# Patient Record
(demographics unavailable — no encounter records)

---

## 2024-11-16 NOTE — HISTORY OF PRESENT ILLNESS
[Nasal congestion] : nasal congestion [Fever] : fever [Nasal Congestion] : nasal congestion [Cough] : cough [de-identified] : fever, cough [FreeTextEntry6] : hree days ago, patient developed a high fever, at times reaching 103F. Tylenol was administered. The patient also started with a noticeable cough, described like that of a smoker. There was concern of a mouth infection due to an incident where the patient drank from the straw of a parent who has an active cold sore. No sores have developed in the patient, and her lips were dry and chapped. No history of a similar mouth infection is reported. Fever has persisted, cough has worsened to the point where it disturbs her sleep and she is producing green mucus.

## 2024-11-16 NOTE — DISCUSSION/SUMMARY
[FreeTextEntry1] : Assessment and Plan: - Purulent rhinitis: Child has thick green mucus and will need an antibiotic. - Therapeutic Interventions: Amoxicillin twice a day for 10 days. Also use saline in the nebulizer to loosen up the mucus. - Diagnostic Interventions: none needed  - Croup : Despite initially thinking her symptoms may have been due to exposure to a parent's cold sore, her symptoms align more with croup. Croup affects the vocal cords, windpipe and bronchial tubes, which could explain the 'smoker's cough'. - Therapeutic Interventions: Prednisolone for 3 days and Saline nebulizer 2-3 times a day - Patient Education: Information shared with the patient and the guardian about the croup. Recommend using mist from a humidifier. Allow the child to breathe cool air during the night by opening a window or door. Fever can be treated with an over-the-counter medication such as acetaminophen or ibuprofen. Coughing can be treated with warm, clear fluids to loosen mucus on the vocal cords. Warm water, apple juice, or lemonade is safe for children older than four months. Frozen juice popsicles also can be given. Keep the child's head elevated. If the child's stridor does not improve contact health care provider immediately. - Follow-Up: If not improving

## 2024-11-16 NOTE — PHYSICAL EXAM
[Mucoid Discharge] : mucoid discharge [Inflamed Nasal Mucosa] : inflamed nasal mucosa [Cobblestoning] : cobblestoning of posterior pharynx [NL] : warm, clear [Stridor] : no stridor

## 2024-12-03 NOTE — HISTORY OF PRESENT ILLNESS
[EENT/Resp Symptoms] : EENT/RESPIRATORY SYMPTOMS [Runny nose] : runny nose [Nasal congestion] : nasal congestion [Known Exposure to COVID-19] : no known exposure to COVID-19 [Hx of recent COVID-19 infection] : no history of recent COVID-19 infection [Dry cough] : dry cough [Acetaminophen] : acetaminophen [Last dose: _____] : last dose: [unfilled] [Fever] : fever [Nasal Congestion] : nasal congestion [Cough] : cough [Decreased Appetite] : decreased appetite [Max Temp: ____] : Max temperature: [unfilled] [de-identified] : Pt being having fever since Saturday and the cough started yesterday.

## 2025-01-19 NOTE — HISTORY OF PRESENT ILLNESS
[de-identified] : Fever and sore throat  [FreeTextEntry6] : Mother states pt has had a fever t max 102 and a sore throat since yesterday. pt is also coughing with congestion and has had diarrhea. Mother has administered Tylenol

## 2025-01-19 NOTE — PHYSICAL EXAM
[Alert] : alert [Tired appearing] : tired appearing [Clear] : right tympanic membrane clear [Clear Rhinorrhea] : clear rhinorrhea [NL] : warm, clear [Acute Distress] : no acute distress [Lethargic] : not lethargic [Toxic] : not toxic [Erythematous Oropharynx] : nonerythematous oropharynx

## 2025-01-19 NOTE — DISCUSSION/SUMMARY
[FreeTextEntry1] : Assessment and Plan:   - **[Influenza]:** Based on the symptoms presented by the patient - oral discomfort, fever, and congestion - and the positive rapid flu test, Ximena has contracted the flu.     - Therapeutic Interventions: The patient has been prescribed Tamiflu, 7.5 ml twice a day x 5 days.      - Diagnostic Tests: Rapid strep is negative and Rapid flu A is POSITIVE. Throat culture is sent out.     - Patient Education: The provider informed the patient's mother about how Tamiflu works by reducing the virus in the body and may shorten the duration of illness.      - Follow-Up: Return if fever continues and runny nose and cough worsen.

## 2025-01-19 NOTE — HISTORY OF PRESENT ILLNESS
[de-identified] : Fever and sore throat  [FreeTextEntry6] : Mother states pt has had a fever t max 102 and a sore throat since yesterday. pt is also coughing with congestion and has had diarrhea. Mother has administered Tylenol

## 2025-01-19 NOTE — HISTORY OF PRESENT ILLNESS
[de-identified] : Fever and sore throat  [FreeTextEntry6] : Mother states pt has had a fever t max 102 and a sore throat since yesterday. pt is also coughing with congestion and has had diarrhea. Mother has administered Tylenol

## 2025-01-30 NOTE — HISTORY OF PRESENT ILLNESS
[de-identified] : Influenza A [FreeTextEntry6] : Patient diagnosed with flu A last week.   Mucopurulent discharge and wet cough have worsened over time No fever. Tolerating PO intake and voiding well. No n/v/d/c.

## 2025-01-30 NOTE — HISTORY OF PRESENT ILLNESS
[de-identified] : Influenza A [FreeTextEntry6] : Patient diagnosed with flu A last week.   Mucopurulent discharge and wet cough have worsened over time No fever. Tolerating PO intake and voiding well. No n/v/d/c.

## 2025-01-30 NOTE — DISCUSSION/SUMMARY
[FreeTextEntry1] : Recommend antibiotics, nasal saline, antihistamine.  Return if symptoms worsen or persist. May use cough syrup as prescribed and honey as needed.

## 2025-01-30 NOTE — PHYSICAL EXAM
[Mucoid Discharge] : mucoid discharge [Wheezing] : no wheezing [Rales] : no rales [Crackles] : no crackles [Tachypnea] : no tachypnea [Rhonchi] : no rhonchi [Belly Breathing] : no belly breathing [Subcostal Retractions] : no subcostal retractions [Suprasternal Retractions] : no suprasternal retractions [NL] : warm, clear

## 2025-03-23 NOTE — HISTORY OF PRESENT ILLNESS
[Normal] : Normal [In nursery school] : In nursery school [Playtime (60 min/d)] : Playtime 60 min a day [< 2 hrs of screen time] : Less than 2 hrs of screen time [Appropiate parent-child communication] : Appropriate parent-child communication [Child given choices] : Child given choices [Child Cooperates] : Child cooperates [No] : No cigarette smoke exposure [Water heater temperature set at <120 degrees F] : Water heater temperature set at <120 degrees F [Car seat in back seat] : Car seat in back seat [Smoke Detectors] : Smoke detectors [Supervised play near cars and streets] : Supervised play near cars and streets [Carbon Monoxide Detectors] : Carbon monoxide detectors [Up to date] : Up to date

## 2025-03-23 NOTE — DEVELOPMENTAL MILESTONES

## 2025-03-23 NOTE — PHYSICAL EXAM
